# Patient Record
Sex: MALE | Race: BLACK OR AFRICAN AMERICAN | NOT HISPANIC OR LATINO | Employment: FULL TIME | URBAN - METROPOLITAN AREA
[De-identification: names, ages, dates, MRNs, and addresses within clinical notes are randomized per-mention and may not be internally consistent; named-entity substitution may affect disease eponyms.]

---

## 2017-11-20 ENCOUNTER — HOSPITAL ENCOUNTER (EMERGENCY)
Facility: HOSPITAL | Age: 27
Discharge: HOME | End: 2017-11-20
Attending: EMERGENCY MEDICINE
Payer: COMMERCIAL

## 2017-11-20 VITALS
DIASTOLIC BLOOD PRESSURE: 75 MMHG | HEIGHT: 75 IN | HEART RATE: 84 BPM | WEIGHT: 194.69 LBS | RESPIRATION RATE: 16 BRPM | TEMPERATURE: 100 F | SYSTOLIC BLOOD PRESSURE: 138 MMHG | BODY MASS INDEX: 24.21 KG/M2 | OXYGEN SATURATION: 97 %

## 2017-11-20 DIAGNOSIS — K52.9 ENTERITIS: Primary | ICD-10-CM

## 2017-11-20 LAB
ALBUMIN SERPL BCP-MCNC: 4 G/DL
ALP SERPL-CCNC: 88 U/L
ALT SERPL W/O P-5'-P-CCNC: 12 U/L
ANION GAP SERPL CALC-SCNC: 11 MMOL/L
AST SERPL-CCNC: 21 U/L
BASOPHILS # BLD AUTO: 0 K/UL
BASOPHILS NFR BLD: 0.2 %
BILIRUB SERPL-MCNC: 0.4 MG/DL
BILIRUB UR QL STRIP: NEGATIVE
BUN SERPL-MCNC: 10 MG/DL
CALCIUM SERPL-MCNC: 9.7 MG/DL
CHLORIDE SERPL-SCNC: 104 MMOL/L
CLARITY UR: CLEAR
CO2 SERPL-SCNC: 24 MMOL/L
COLOR UR: YELLOW
CREAT SERPL-MCNC: 1.2 MG/DL
DIFFERENTIAL METHOD: ABNORMAL
EOSINOPHIL # BLD AUTO: 0.1 K/UL
EOSINOPHIL NFR BLD: 2.2 %
ERYTHROCYTE [DISTWIDTH] IN BLOOD BY AUTOMATED COUNT: 13.6 %
EST. GFR  (AFRICAN AMERICAN): >60 ML/MIN/1.73 M^2
EST. GFR  (NON AFRICAN AMERICAN): >60 ML/MIN/1.73 M^2
GLUCOSE SERPL-MCNC: 83 MG/DL
GLUCOSE UR QL STRIP: NEGATIVE
HCT VFR BLD AUTO: 48.3 %
HGB BLD-MCNC: 16.2 G/DL
HGB UR QL STRIP: NEGATIVE
KETONES UR QL STRIP: NEGATIVE
LEUKOCYTE ESTERASE UR QL STRIP: NEGATIVE
LIPASE SERPL-CCNC: 16 U/L
LYMPHOCYTES # BLD AUTO: 1.3 K/UL
LYMPHOCYTES NFR BLD: 21.6 %
MCH RBC QN AUTO: 28.7 PG
MCHC RBC AUTO-ENTMCNC: 33.5 G/DL
MCV RBC AUTO: 86 FL
MONOCYTES # BLD AUTO: 1.2 K/UL
MONOCYTES NFR BLD: 19.9 %
NEUTROPHILS # BLD AUTO: 3.4 K/UL
NEUTROPHILS NFR BLD: 56.1 %
NITRITE UR QL STRIP: NEGATIVE
PH UR STRIP: 6 [PH] (ref 5–8)
PLATELET # BLD AUTO: 218 K/UL
PMV BLD AUTO: 7.6 FL
POTASSIUM SERPL-SCNC: 3.8 MMOL/L
PROT SERPL-MCNC: 8 G/DL
PROT UR QL STRIP: NEGATIVE
RBC # BLD AUTO: 5.64 M/UL
SODIUM SERPL-SCNC: 139 MMOL/L
SP GR UR STRIP: <=1.005 (ref 1–1.03)
URN SPEC COLLECT METH UR: ABNORMAL
UROBILINOGEN UR STRIP-ACNC: NEGATIVE EU/DL
WBC # BLD AUTO: 6.1 K/UL

## 2017-11-20 PROCEDURE — 99284 EMERGENCY DEPT VISIT MOD MDM: CPT | Mod: 25

## 2017-11-20 PROCEDURE — 25000003 PHARM REV CODE 250: Performed by: EMERGENCY MEDICINE

## 2017-11-20 PROCEDURE — 96360 HYDRATION IV INFUSION INIT: CPT

## 2017-11-20 PROCEDURE — 36415 COLL VENOUS BLD VENIPUNCTURE: CPT

## 2017-11-20 PROCEDURE — 80053 COMPREHEN METABOLIC PANEL: CPT

## 2017-11-20 PROCEDURE — 25500020 PHARM REV CODE 255

## 2017-11-20 PROCEDURE — 81003 URINALYSIS AUTO W/O SCOPE: CPT

## 2017-11-20 PROCEDURE — 85025 COMPLETE CBC W/AUTO DIFF WBC: CPT

## 2017-11-20 PROCEDURE — 83690 ASSAY OF LIPASE: CPT

## 2017-11-20 RX ORDER — DICYCLOMINE HYDROCHLORIDE 10 MG/ML
20 INJECTION INTRAMUSCULAR
Status: DISCONTINUED | OUTPATIENT
Start: 2017-11-20 | End: 2017-11-20 | Stop reason: HOSPADM

## 2017-11-20 RX ORDER — DICYCLOMINE HYDROCHLORIDE 20 MG/1
20 TABLET ORAL 3 TIMES DAILY PRN
Qty: 20 TABLET | Refills: 0 | Status: SHIPPED | OUTPATIENT
Start: 2017-11-20 | End: 2017-12-20

## 2017-11-20 RX ORDER — SODIUM CHLORIDE 9 MG/ML
1000 INJECTION, SOLUTION INTRAVENOUS
Status: COMPLETED | OUTPATIENT
Start: 2017-11-20 | End: 2017-11-20

## 2017-11-20 RX ADMIN — SODIUM CHLORIDE 1000 ML: 0.9 INJECTION, SOLUTION INTRAVENOUS at 08:11

## 2017-11-20 RX ADMIN — IOHEXOL 100 ML: 350 INJECTION, SOLUTION INTRAVENOUS at 08:11

## 2017-11-21 NOTE — ED PROVIDER NOTES
"Encounter Date: 11/20/2017    SCRIBE #1 NOTE: I, Valencia Stover, am scribing for, and in the presence of,  Dr. Stoll . I have scribed the entire note.       History     Chief Complaint   Patient presents with    Abdominal Pain     Started after eating a thanksgiving meal at work. Started with the pain and diarrhea. History of H.pylori. No fever, nausea or vomiting    Diarrhea     11/20/2017  7:50 PM     Chief Complaint:  Abdominal pain       The patient is a 27 y.o. male who is presenting with the gradual onset of epigastric abdominal pain that began x 4 days ago. The pt reports that the pain is constant, moderate, and feels like "someone is punching him". He notes that eating exacerbates is pain while nothing has helped to relieve it. He endorses numerous episodes of non bloody diarrhea. No recent emesis, fevers, constipation, or urinary sx. He reports that his sx began after eating a [place] dinner but denies known sick contacts. Hx of H. Pylori. No pertinent past surgical hx               The history is provided by the patient and medical records.     Review of patient's allergies indicates:  No Known Allergies  Past Medical History:   Diagnosis Date    Helicobacter pylori infection      Past Surgical History:   Procedure Laterality Date    FRACTURE SURGERY Right 2008    RIGHT HAND     Family History   Problem Relation Age of Onset    Cancer Mother      Social History   Substance Use Topics    Smoking status: Never Smoker    Smokeless tobacco: Never Used    Alcohol use Yes      Comment: rarely     Review of Systems   Constitutional: Negative for fever.   HENT: Negative for sore throat.    Respiratory: Negative for shortness of breath.    Cardiovascular: Negative for chest pain.   Gastrointestinal: Positive for abdominal pain and diarrhea. Negative for nausea.   Genitourinary: Negative for dysuria.   Musculoskeletal: Negative for back pain.   Skin: Negative for rash.   Neurological: Negative for weakness. "   Hematological: Does not bruise/bleed easily.       Physical Exam     Initial Vitals [11/20/17 1934]   BP Pulse Resp Temp SpO2   138/75 84 16 99.6 °F (37.6 °C) 97 %      MAP       96         Physical Exam    Nursing note and vitals reviewed.  Constitutional: He appears well-developed and well-nourished. He is not diaphoretic. No distress.   HENT:   Head: Normocephalic and atraumatic.   Mouth/Throat: Oropharynx is clear and moist.   Eyes: EOM are normal. Pupils are equal, round, and reactive to light.   Neck: Normal range of motion. Neck supple.   Cardiovascular: Normal rate, regular rhythm, normal heart sounds and intact distal pulses. Exam reveals no gallop and no friction rub.    No murmur heard.  Pulmonary/Chest: Breath sounds normal. No respiratory distress. He has no wheezes. He has no rhonchi. He has no rales.   Abdominal: Soft. He exhibits no distension. There is tenderness. There is no rebound and no guarding.   Epigastric and LLQ TTP.    Musculoskeletal: Normal range of motion.   Neurological: He is alert and oriented to person, place, and time.   Skin: Skin is warm.   Psychiatric: He has a normal mood and affect. His behavior is normal. Judgment and thought content normal.         ED Course   Procedures  Labs Reviewed   COMPREHENSIVE METABOLIC PANEL   CBC W/ AUTO DIFFERENTIAL   LIPASE   URINALYSIS             Medical Decision Making:   Initial Assessment:   27-year-old male presented with a chief complaint of abdominal pain and diarrhea.  Differential Diagnosis:   Ddx includes but is not limited to diverticulitis, enteritis, colitis, and pancreatitis.   Clinical Tests:   Lab Tests: Ordered and Reviewed  Radiological Study: Ordered and Reviewed  ED Management:  The patient was emergently evaluated in the emergency Department, his evaluation was significant for a young male with abdominal tenderness.  The patient's labs showed no acute processes.  The patient refused anything for pain relief in the  emergency department.  The patient's CT scan was significant for a mild enteritis.  He is stable for discharge to home.  He will be discharged home with by mouth Bentyl and he is to follow-up with his PCP for further care.            Scribe Attestation:   Scribe #1: I performed the above scribed service and the documentation accurately describes the services I performed. I attest to the accuracy of the note.    I, Dr. Emiliano Stoll, personally performed the services described in this documentation. All medical record entries made by the scribe were at my direction and in my presence.  I have reviewed the chart and agree that the record reflects my personal performance and is accurate and complete. Emiliano Stoll MD.  7:58 PM 11/20/2017          ED Course      Clinical Impression:   There were no encounter diagnoses.                           Emiliano Stoll MD  11/20/17 0207

## 2019-11-23 ENCOUNTER — HOSPITAL ENCOUNTER (EMERGENCY)
Facility: HOSPITAL | Age: 29
Discharge: HOME | End: 2019-11-24
Attending: EMERGENCY MEDICINE
Payer: COMMERCIAL

## 2019-11-23 DIAGNOSIS — R10.13 EPIGASTRIC PAIN: Primary | ICD-10-CM

## 2019-11-23 PROCEDURE — 99283 EMERGENCY DEPT VISIT LOW MDM: CPT

## 2019-11-23 RX ADMIN — LIDOCAINE HYDROCHLORIDE: 20 SOLUTION ORAL; TOPICAL at 11:11

## 2019-11-24 VITALS
TEMPERATURE: 99 F | SYSTOLIC BLOOD PRESSURE: 128 MMHG | DIASTOLIC BLOOD PRESSURE: 78 MMHG | HEIGHT: 74 IN | BODY MASS INDEX: 26.31 KG/M2 | OXYGEN SATURATION: 99 % | HEART RATE: 71 BPM | WEIGHT: 205 LBS | RESPIRATION RATE: 16 BRPM

## 2019-11-24 LAB
ALBUMIN SERPL BCP-MCNC: 4 G/DL (ref 3.5–5.2)
ALP SERPL-CCNC: 71 U/L (ref 55–135)
ALT SERPL W/O P-5'-P-CCNC: 10 U/L (ref 10–44)
ANION GAP SERPL CALC-SCNC: 10 MMOL/L (ref 8–16)
AST SERPL-CCNC: 15 U/L (ref 10–40)
BASOPHILS # BLD AUTO: 0.01 K/UL (ref 0–0.2)
BASOPHILS NFR BLD: 0.1 % (ref 0–1.9)
BILIRUB SERPL-MCNC: 0.4 MG/DL (ref 0.1–1)
BUN SERPL-MCNC: 9 MG/DL (ref 6–20)
CALCIUM SERPL-MCNC: 9.4 MG/DL (ref 8.7–10.5)
CHLORIDE SERPL-SCNC: 100 MMOL/L (ref 95–110)
CO2 SERPL-SCNC: 30 MMOL/L (ref 23–29)
CREAT SERPL-MCNC: 1.1 MG/DL (ref 0.5–1.4)
DIFFERENTIAL METHOD: ABNORMAL
EOSINOPHIL # BLD AUTO: 0.3 K/UL (ref 0–0.5)
EOSINOPHIL NFR BLD: 4.1 % (ref 0–8)
ERYTHROCYTE [DISTWIDTH] IN BLOOD BY AUTOMATED COUNT: 12.9 % (ref 11.5–14.5)
EST. GFR  (AFRICAN AMERICAN): >60 ML/MIN/1.73 M^2
EST. GFR  (NON AFRICAN AMERICAN): >60 ML/MIN/1.73 M^2
GLUCOSE SERPL-MCNC: 98 MG/DL (ref 70–110)
HCT VFR BLD AUTO: 44.5 % (ref 40–54)
HGB BLD-MCNC: 15 G/DL (ref 14–18)
IMM GRANULOCYTES # BLD AUTO: 0.03 K/UL (ref 0–0.04)
LIPASE SERPL-CCNC: 14 U/L (ref 4–60)
LYMPHOCYTES # BLD AUTO: 1.4 K/UL (ref 1–4.8)
LYMPHOCYTES NFR BLD: 17.9 % (ref 18–48)
MCH RBC QN AUTO: 29.5 PG (ref 27–31)
MCHC RBC AUTO-ENTMCNC: 33.7 G/DL (ref 32–36)
MCV RBC AUTO: 88 FL (ref 82–98)
MONOCYTES # BLD AUTO: 1.2 K/UL (ref 0.3–1)
MONOCYTES NFR BLD: 16.2 % (ref 4–15)
NEUTROPHILS # BLD AUTO: 4.7 K/UL (ref 1.8–7.7)
NEUTROPHILS NFR BLD: 61.3 % (ref 38–73)
NRBC BLD-RTO: 0 /100 WBC
PLATELET # BLD AUTO: 233 K/UL (ref 150–350)
PMV BLD AUTO: 9.4 FL (ref 9.2–12.9)
POTASSIUM SERPL-SCNC: 3.6 MMOL/L (ref 3.5–5.1)
PROT SERPL-MCNC: 7.4 G/DL (ref 6–8.4)
RBC # BLD AUTO: 5.08 M/UL (ref 4.6–6.2)
SODIUM SERPL-SCNC: 140 MMOL/L (ref 136–145)
WBC # BLD AUTO: 7.65 K/UL (ref 3.9–12.7)

## 2019-11-24 PROCEDURE — 83690 ASSAY OF LIPASE: CPT

## 2019-11-24 PROCEDURE — 36415 COLL VENOUS BLD VENIPUNCTURE: CPT

## 2019-11-24 PROCEDURE — 25000003 PHARM REV CODE 250: Performed by: EMERGENCY MEDICINE

## 2019-11-24 PROCEDURE — 80053 COMPREHEN METABOLIC PANEL: CPT

## 2019-11-24 PROCEDURE — 85025 COMPLETE CBC W/AUTO DIFF WBC: CPT

## 2019-11-24 RX ORDER — LANSOPRAZOLE, AMOXICILLIN, CLARITHROMYCIN 30-500-500
KIT ORAL
Qty: 1 KIT | Refills: 0 | Status: SHIPPED | OUTPATIENT
Start: 2019-11-24 | End: 2019-12-08

## 2019-11-24 RX ORDER — LANSOPRAZOLE, AMOXICILLIN, CLARITHROMYCIN 30-500-500
KIT ORAL
Qty: 1 KIT | Refills: 0 | Status: SHIPPED | OUTPATIENT
Start: 2019-11-24 | End: 2019-11-24 | Stop reason: SDUPTHER

## 2019-11-24 NOTE — ED NOTES
Assumed care:  Bernardino Bolanos is awake, alert and oriented x 3, skin warm and dry, in NAD.  CO abd pain that started 3 days ago after eating a popeyes chicken sandwich.  States he had diarrhea then but now that's gone but still having pain.  Denies NV&D now.    Patient identifiers for Bernardino Bolanos checked and correct.  LOC:  Bernardino Bolanos is awake, alert, and aware of environment with an appropriate affect. He is oriented x 3 and speaking appropriately.  APPEARANCE:  He is resting comfortably and in no acute distress. He is clean and well groomed, patient's clothing is properly fastened.  SKIN:  The skin is warm and dry. He has normal skin turgor and moist mucus membranes. Skin is intact; no bruising or breakdown noted.  MUSCULOSKELETAL:  He is moving all extremities well, no obvious deformities noted. Pulses intact.   RESPIRATORY:  Airway is open and patent. Respirations are spontaneous and non-labored with normal effort and rate.  CARDIAC:  He has a normal rate and rhythm. No peripheral edema noted. Capillary refill < 3 seconds.  ABDOMEN:  No distention noted.  Soft and non-tender upon palpation.  abd pain  NEUROLOGICAL:  PERRL. Facial expression is symmetrical. Hand grasps are equal bilaterally. Normal sensation in all extremities when touched with finger.  Allergies reported:  Review of patient's allergies indicates:  No Known Allergies  OTHER NOTES:

## 2019-11-24 NOTE — ED PROVIDER NOTES
Encounter Date: 11/23/2019    SCRIBE #1 NOTE: I, Krissy Charles, am scribing for, and in the presence of, Christ Bustillos MD.       History     Chief Complaint   Patient presents with    Abdominal Pain     x 3 days       Time seen by provider: 11:44 PM on 11/23/2019    Bernardino Bolanos is a 29 y.o. male with PMHx of H. Pylori who presents to the ED with abdominal pain since 3 days ago. He reports that his symptoms are similar to the symptoms he had with the H. Pylori infection several years ago.  He is complaining of epigastric pain without radiation.  No aggravating or alleviating factors.  Decreased appetite.  He denies worsening pain after eating. He denies pain that radiates to the back, N/V/D, blood in stool, or any other symptoms at this time. He denies Hx of smoking, drinking, or drug use. He states that tylenol provides some relief of abdominal pain. He went on a cruise to [place] 11/9/19-11/13/19 with no apparent illness afterwards. No abdominal PSHx.    The history is provided by the patient.     Review of patient's allergies indicates:  No Known Allergies  Past Medical History:   Diagnosis Date    Helicobacter pylori infection      Past Surgical History:   Procedure Laterality Date    FRACTURE SURGERY Right 2008    RIGHT HAND     Family History   Problem Relation Age of Onset    Cancer Mother      Social History     Tobacco Use    Smoking status: Never Smoker    Smokeless tobacco: Never Used   Substance Use Topics    Alcohol use: Yes     Comment: rarely    Drug use: No     Review of Systems   Constitutional: Positive for appetite change (Decreased). Negative for activity change and fatigue.   HENT: Negative for rhinorrhea.    Respiratory: Negative for cough.    Cardiovascular: Negative for leg swelling.   Gastrointestinal: Positive for abdominal pain. Negative for blood in stool, diarrhea, nausea and vomiting.   Endocrine: Negative for polyuria.   Genitourinary: Negative for dysuria.    Musculoskeletal: Negative for back pain.   Skin: Negative for rash.   Hematological: Negative for adenopathy.       Physical Exam     Initial Vitals [11/23/19 2337]   BP Pulse Resp Temp SpO2   133/86 74 16 98.5 °F (36.9 °C) 99 %      MAP       --         Physical Exam    Nursing note and vitals reviewed.  Constitutional: He appears well-developed and well-nourished. He is not diaphoretic.  Non-toxic appearance. He does not have a sickly appearance. He does not appear ill. No distress.   HENT:   Head: Normocephalic and atraumatic.   Mouth/Throat: Oropharynx is clear and moist.   Eyes: EOM are normal.   Neck: Normal range of motion. Neck supple. Normal range of motion present. No neck rigidity.   Cardiovascular: Normal rate, regular rhythm and normal heart sounds. Exam reveals no gallop and no friction rub.    No murmur heard.  Pulmonary/Chest: Breath sounds normal. No respiratory distress. He has no wheezes. He has no rhonchi. He has no rales.   Abdominal: He exhibits no distension and no mass. There is tenderness in the epigastric area. There is no rigidity, no rebound, no guarding and negative Lee's sign.   Musculoskeletal: Normal range of motion.   Neurological: He is alert and oriented to person, place, and time.   Skin: Skin is warm and dry. No rash noted.   Psychiatric: He has a normal mood and affect. His behavior is normal. Judgment and thought content normal.         ED Course   Procedures  Labs Reviewed - No data to display       Imaging Results    None          Medical Decision Making:   History:   Old Medical Records: I decided to obtain old medical records.  Clinical Tests:   Lab Tests: Ordered and Reviewed            Scribe Attestation:   Scribe #1: I performed the above scribed service and the documentation accurately describes the services I performed. I attest to the accuracy of the note.    I, Dr. Bustillos, personally performed the services described in this documentation. All medical record  entries made by the scribe were at my direction and in my presence.  I have reviewed the chart and agree that the record reflects my personal performance and is accurate and complete.1:37 AM 11/24/2019            ED Course as of Nov 24 0134   Sat Nov 23, 2019 2339 BP: 133/86 [EF]   2339 Temp: 98.5 °F (36.9 °C) [EF]   2339 Temp src: Oral [EF]   2339 Pulse: 74 [EF]   2339 Resp: 16 [EF]   2339 SpO2: 99 % [EF]   Sun Nov 24, 2019   0045 Labs are unremarkable, patient feels better after GI cocktail    [EF]   0105 29-year-old with a history of H pylori gastritis presents with epigastric pain for 3 days consistent with prior episode several years ago of H pylori.  Labs are unremarkable. Some epigastric tenderness on exam but no right upper quadrant tenderness to suggest cholecystitis or cholelithiasis.  No vomiting in the emergency room no reported hematemesis melena.  Well-appearing in the ED no imaging is warranted in my opinion no indication for an ultrasound or a CT.  Pain improved with GI cocktail.  I did discuss options including over-the-counter meds for gastritis but the patient would like to start treatment for possible H pylori.  No confirmatory test in the emergency room but he reports identical symptoms to a previous episode when he was treated successfully.  Patient will be prescribed treatment for H pylori and referred to GI.    [EF]      ED Course User Index  [EF] Christ Bustillos MD                Clinical Impression:       ICD-10-CM ICD-9-CM   1. Epigastric pain R10.13 789.06                             Christ Bustillos MD  11/24/19 0137

## 2019-12-04 ENCOUNTER — OFFICE VISIT (OUTPATIENT)
Dept: FAMILY MEDICINE | Facility: CLINIC | Age: 29
End: 2019-12-04
Payer: COMMERCIAL

## 2019-12-04 VITALS
HEIGHT: 74 IN | OXYGEN SATURATION: 96 % | HEART RATE: 70 BPM | BODY MASS INDEX: 26 KG/M2 | TEMPERATURE: 99 F | DIASTOLIC BLOOD PRESSURE: 64 MMHG | WEIGHT: 202.63 LBS | SYSTOLIC BLOOD PRESSURE: 114 MMHG

## 2019-12-04 DIAGNOSIS — R19.7 DIARRHEA, UNSPECIFIED TYPE: ICD-10-CM

## 2019-12-04 DIAGNOSIS — E66.3 OVERWEIGHT (BMI 25.0-29.9): ICD-10-CM

## 2019-12-04 DIAGNOSIS — R10.13 EPIGASTRIC PAIN: Primary | ICD-10-CM

## 2019-12-04 PROCEDURE — 99999 PR PBB SHADOW E&M-EST. PATIENT-LVL III: ICD-10-PCS | Mod: PBBFAC,,, | Performed by: NURSE PRACTITIONER

## 2019-12-04 PROCEDURE — 3008F BODY MASS INDEX DOCD: CPT | Mod: CPTII,S$GLB,, | Performed by: NURSE PRACTITIONER

## 2019-12-04 PROCEDURE — 99203 OFFICE O/P NEW LOW 30 MIN: CPT | Mod: S$GLB,,, | Performed by: NURSE PRACTITIONER

## 2019-12-04 PROCEDURE — 3008F PR BODY MASS INDEX (BMI) DOCUMENTED: ICD-10-PCS | Mod: CPTII,S$GLB,, | Performed by: NURSE PRACTITIONER

## 2019-12-04 PROCEDURE — 99999 PR PBB SHADOW E&M-EST. PATIENT-LVL III: CPT | Mod: PBBFAC,,, | Performed by: NURSE PRACTITIONER

## 2019-12-04 PROCEDURE — 99203 PR OFFICE/OUTPT VISIT, NEW, LEVL III, 30-44 MIN: ICD-10-PCS | Mod: S$GLB,,, | Performed by: NURSE PRACTITIONER

## 2019-12-05 NOTE — PROGRESS NOTES
Subjective:       Patient ID: Bernardino Bolanos is a 29 y.o. male.    Chief Complaint: [facility] ER  epigastric pain    29-year-old with a history of H pylori gastritis presents today for ER follow up of Epigastric pain. Patient was seen in ER for epigastric pain for 3 days consistent with prior episode several years ago of H pylori. Pain improved with GI cocktail.  Patient reports him and his daughter was eating popeyes chicken sandwich and both started with diarrhea the next day. He reports the abdominal pain is not the same when he was diagnosis with H.pylori.    Abdominal Pain   This is a new problem. The current episode started in the past 7 days. The problem occurs intermittently. The problem has been gradually improving. The pain is located in the epigastric region. The pain is at a severity of 3/10. The pain is mild. The quality of the pain is sharp. The abdominal pain does not radiate. Pertinent negatives include no belching, constipation, diarrhea, flatus, headaches, hematochezia, hematuria, nausea or vomiting. The pain is aggravated by palpation. The pain is relieved by nothing. He has tried acetaminophen for the symptoms. The treatment provided mild relief. Prior diagnostic workup includes upper endoscopy.       Past Medical History:   Diagnosis Date    Helicobacter pylori infection        Review of patient's allergies indicates:  No Known Allergies    No current outpatient medications on file.    Review of Systems   Constitutional: Negative for unexpected weight change.   HENT: Negative for trouble swallowing.    Eyes: Negative for visual disturbance.   Respiratory: Negative for shortness of breath.    Cardiovascular: Negative for chest pain, palpitations and leg swelling.   Gastrointestinal: Positive for abdominal pain. Negative for blood in stool, constipation, diarrhea, flatus, hematochezia, nausea and vomiting.   Genitourinary: Negative for hematuria.   Skin: Negative for rash.   Allergic/Immunologic:  "Negative for immunocompromised state.   Neurological: Negative for headaches.   Hematological: Does not bruise/bleed easily.   Psychiatric/Behavioral: Negative for agitation. The patient is not nervous/anxious.        Objective:      /64   Pulse 70   Temp 98.9 °F (37.2 °C)   Ht 6' 2" (1.88 m)   Wt 91.9 kg (202 lb 9.6 oz)   SpO2 96%   BMI 26.01 kg/m²   Physical Exam   Constitutional: He is oriented to person, place, and time. He appears well-developed and well-nourished.   Eyes: Pupils are equal, round, and reactive to light. Conjunctivae and EOM are normal.   Neck: Normal range of motion. Neck supple.   Cardiovascular: Normal rate, regular rhythm, normal heart sounds and intact distal pulses.   Pulmonary/Chest: Effort normal and breath sounds normal.   Abdominal: Soft. Bowel sounds are normal. There is tenderness in the epigastric area.   Musculoskeletal: Normal range of motion.   Neurological: He is alert and oriented to person, place, and time.   Skin: Skin is warm and dry.   Psychiatric: He has a normal mood and affect. His behavior is normal. Judgment and thought content normal.       Assessment:       1. Epigastric pain    2. Diarrhea, unspecified type    3. Overweight (BMI 25.0-29.9)        Plan:       Epigastric pain  Improved. Patient thinks it was related to foods  Diarrhea, unspecified type  Improved, take OTC Inodium  Overweight (BMI 25.0-29.9)  Counseled patient on his ideal body weight, health consequences of being obese and current recommendations including weekly exercise and a heart healthy diet.  Current BMI is:Estimated body mass index is 26.01 kg/m² as calculated from the following:    Height as of this encounter: 6' 2" (1.88 m).    Weight as of this encounter: 91.9 kg (202 lb 9.6 oz)..  Patient is aware that ideal BMI < 25 or Weight in (lb) to have BMI = 25: 194.3.          .  "

## 2020-07-23 ENCOUNTER — OFFICE VISIT (OUTPATIENT)
Dept: FAMILY MEDICINE | Facility: CLINIC | Age: 30
End: 2020-07-23
Payer: COMMERCIAL

## 2020-07-23 DIAGNOSIS — R11.0 NAUSEA: ICD-10-CM

## 2020-07-23 DIAGNOSIS — R19.7 DIARRHEA, UNSPECIFIED TYPE: Primary | ICD-10-CM

## 2020-07-23 DIAGNOSIS — E66.3 OVERWEIGHT (BMI 25.0-29.9): ICD-10-CM

## 2020-07-23 PROCEDURE — 99213 PR OFFICE/OUTPT VISIT, EST, LEVL III, 20-29 MIN: ICD-10-PCS | Mod: 95,,, | Performed by: NURSE PRACTITIONER

## 2020-07-23 PROCEDURE — 99213 OFFICE O/P EST LOW 20 MIN: CPT | Mod: 95,,, | Performed by: NURSE PRACTITIONER

## 2020-07-23 NOTE — PROGRESS NOTES
Subjective:       Patient ID: Bernardino Bolanos is a 30 y.o. male.    Chief Complaint: No chief complaint on file.    The patient location is: [place], [place].  The chief complaint leading to consultation is: diarrhea and nausea    Visit type: audiovisual    Face to Face time with patient: 20 minutes of total time spent on the encounter, which includes face to face time and non-face to face time preparing to see the patient (eg, review of tests), Obtaining and/or reviewing separately obtained history, Documenting clinical information in the electronic or other health record, Independently interpreting results (not separately reported) and communicating results to the patient/family/caregiver, or Care coordination (not separately reported).         Each patient to whom he or she provides medical services by telemedicine is:  (1) informed of the relationship between the physician and patient and the respective role of any other health care provider with respect to management of the patient; and (2) notified that he or she may decline to receive medical services by telemedicine and may withdraw from such care at any time.    Notes: Patient presents today via Telemedicine with complains of diarrhea and nausea for two days. Had pasta on yesterday, diarrhea an nausea strated 1-2 hours later. He is taking pepto bismuthal and diarrhea has improved.    Diarrhea   This is a new problem. The current episode started yesterday. The problem occurs 2 to 4 times per day. The problem has been gradually improving. The stool consistency is described as watery. The patient states that diarrhea does not awaken him from sleep. Pertinent negatives include no abdominal pain, bloating, coughing, fever, headaches, increased  flatus or vomiting. Nothing aggravates the symptoms. Risk factors include suspect food intake. He has tried bismuth subsalicylate for the symptoms.       Past Medical History:   Diagnosis Date    Helicobacter pylori  "infection        Review of patient's allergies indicates:  No Known Allergies    No current outpatient medications on file.    Review of Systems   Constitutional: Negative for fever and unexpected weight change.   HENT: Negative for trouble swallowing.    Eyes: Negative for visual disturbance.   Respiratory: Negative for cough and shortness of breath.    Cardiovascular: Negative for chest pain, palpitations and leg swelling.   Gastrointestinal: Positive for diarrhea and nausea. Negative for abdominal pain, bloating, blood in stool, flatus and vomiting.   Genitourinary: Negative for hematuria.   Skin: Negative for rash.   Allergic/Immunologic: Negative for immunocompromised state.   Neurological: Negative for headaches.   Hematological: Does not bruise/bleed easily.   Psychiatric/Behavioral: Negative for agitation. The patient is not nervous/anxious.        Objective:      There were no vitals taken for this visit.  Physical Exam  Pulmonary:      Effort: Pulmonary effort is normal.   Neurological:      Mental Status: He is alert and oriented to person, place, and time.   Psychiatric:         Mood and Affect: Mood normal.         Behavior: Behavior normal.         Thought Content: Thought content normal.         Judgment: Judgment normal.         Assessment:       1. Diarrhea, unspecified type    2. Nausea    3. Overweight (BMI 25.0-29.9)        Plan:       Diarrhea, unspecified type  Patient reports improvement with medication, no diarrhea today  Work excuse given  Nausea  Patient reports improvement with medication, no nausea today  Overweight (BMI 25.0-29.9)  Counseled patient on his ideal body weight, health consequences of being obese and current recommendations including weekly exercise and a heart healthy diet.  Current BMI is:Estimated body mass index is 26.01 kg/m² as calculated from the following:    Height as of 12/4/19: 6' 2" (1.88 m).    Weight as of 12/4/19: 91.9 kg (202 lb 9.6 oz)..  Patient is aware " that ideal BMI < 25 or  .            Time spent with patient: 20 minutes    Patient with be reevaluated in 1 year or sooner prn    Greater than 50% of this visit was spent counseling as described in above documentation:Yes

## 2020-07-23 NOTE — LETTER
July 23, 2020      [facility]  [address]  [place] [address]  Phone: [phone]  Fax: [phone]       Patient: Bernardino Bolanos   YOB: 1990  Date of Visit: 07/23/2020    To Whom It May Concern:    Mattie Bolanos  was at [facility] on 07/23/2020. Please excuse Mr. Bustillos on 07/22/20 and 07/23/20. He may return to work/school on 07/24/20 with no restrictions. If you have any questions or concerns, or if I can be of further assistance, please do not hesitate to contact me.    Sincerely,    Beverley Pro NP

## 2020-09-02 ENCOUNTER — OFFICE VISIT (OUTPATIENT)
Dept: FAMILY MEDICINE | Facility: CLINIC | Age: 30
End: 2020-09-02
Payer: COMMERCIAL

## 2020-09-02 VITALS
HEART RATE: 68 BPM | SYSTOLIC BLOOD PRESSURE: 124 MMHG | TEMPERATURE: 98 F | WEIGHT: 199.06 LBS | BODY MASS INDEX: 25.56 KG/M2 | DIASTOLIC BLOOD PRESSURE: 60 MMHG

## 2020-09-02 DIAGNOSIS — H93.8X1 SENSATION OF FULLNESS IN RIGHT EAR: ICD-10-CM

## 2020-09-02 DIAGNOSIS — H91.91: Primary | ICD-10-CM

## 2020-09-02 DIAGNOSIS — E66.3 OVERWEIGHT (BMI 25.0-29.9): ICD-10-CM

## 2020-09-02 PROCEDURE — 99213 OFFICE O/P EST LOW 20 MIN: CPT | Mod: S$GLB,,, | Performed by: NURSE PRACTITIONER

## 2020-09-02 PROCEDURE — 3008F PR BODY MASS INDEX (BMI) DOCUMENTED: ICD-10-PCS | Mod: CPTII,S$GLB,, | Performed by: NURSE PRACTITIONER

## 2020-09-02 PROCEDURE — 99999 PR PBB SHADOW E&M-EST. PATIENT-LVL IV: ICD-10-PCS | Mod: PBBFAC,,, | Performed by: NURSE PRACTITIONER

## 2020-09-02 PROCEDURE — 99999 PR PBB SHADOW E&M-EST. PATIENT-LVL IV: CPT | Mod: PBBFAC,,, | Performed by: NURSE PRACTITIONER

## 2020-09-02 PROCEDURE — 99213 PR OFFICE/OUTPT VISIT, EST, LEVL III, 20-29 MIN: ICD-10-PCS | Mod: S$GLB,,, | Performed by: NURSE PRACTITIONER

## 2020-09-02 PROCEDURE — 3008F BODY MASS INDEX DOCD: CPT | Mod: CPTII,S$GLB,, | Performed by: NURSE PRACTITIONER

## 2020-09-02 NOTE — PROGRESS NOTES
Subjective:       Patient ID: Bernardino Bolanos is a 30 y.o. male.    Chief Complaint: Otalgia    Otalgia   There is pain in the right ear. This is a recurrent problem. The current episode started in the past 7 days. The problem occurs every few hours. The problem has been gradually worsening. There has been no fever. The pain is at a severity of 0/10. The patient is experiencing no pain. Associated symptoms include hearing loss. Pertinent negatives include no coughing, diarrhea, ear discharge, headaches, neck pain, rhinorrhea or vomiting. He has tried ear drops for the symptoms.       Past Medical History:   Diagnosis Date    Helicobacter pylori infection        Review of patient's allergies indicates:  No Known Allergies    No current outpatient medications on file.    Review of Systems   Constitutional: Negative for activity change and unexpected weight change.   HENT: Positive for ear pain and hearing loss. Negative for ear discharge, rhinorrhea and trouble swallowing.    Eyes: Negative for discharge and visual disturbance.   Respiratory: Negative for cough, chest tightness and wheezing.    Cardiovascular: Negative for chest pain and palpitations.   Gastrointestinal: Negative for blood in stool, constipation, diarrhea and vomiting.   Endocrine: Negative for polydipsia and polyuria.   Genitourinary: Negative for difficulty urinating, hematuria and urgency.   Musculoskeletal: Negative for arthralgias, joint swelling and neck pain.   Neurological: Negative for weakness and headaches.   Psychiatric/Behavioral: Negative for confusion and dysphoric mood.       Objective:      /60 (BP Location: Left arm, Patient Position: Sitting, BP Method: Large (Manual))   Pulse 68   Temp 98.1 °F (36.7 °C)   Wt 90.3 kg (199 lb 1.2 oz)   BMI 25.56 kg/m²   Physical Exam  Constitutional:       Appearance: He is well-developed.   HENT:      Right Ear: Decreased hearing noted. No drainage, swelling or tenderness. No middle ear  "effusion. There is no impacted cerumen. No foreign body. Tympanic membrane is not injected, retracted or bulging.   Eyes:      Conjunctiva/sclera: Conjunctivae normal.      Pupils: Pupils are equal, round, and reactive to light.   Neck:      Musculoskeletal: Normal range of motion and neck supple.   Cardiovascular:      Rate and Rhythm: Normal rate and regular rhythm.      Heart sounds: Normal heart sounds.   Pulmonary:      Effort: Pulmonary effort is normal.      Breath sounds: Normal breath sounds.   Abdominal:      General: Bowel sounds are normal.      Palpations: Abdomen is soft.   Musculoskeletal: Normal range of motion.   Skin:     General: Skin is warm and dry.   Neurological:      Mental Status: He is alert and oriented to person, place, and time.   Psychiatric:         Behavior: Behavior normal.         Thought Content: Thought content normal.         Judgment: Judgment normal.         Assessment:       1. Decrease in the ability to hear, right    2. Sensation of fullness in right ear    3. Overweight (BMI 25.0-29.9)        Plan:       Decrease in the ability to hear, right  -     Ambulatory referral/consult to ENT; Future; Expected date: 09/09/2020    Sensation of fullness in right ear  -     Ambulatory referral/consult to ENT; Future; Expected date: 09/09/2020    Overweight (BMI 25.0-29.9)  Counseled patient on his ideal body weight, health consequences of being obese and current recommendations including weekly exercise and a heart healthy diet.  Current BMI is:Estimated body mass index is 25.56 kg/m² as calculated from the following:    Height as of 12/4/19: 6' 2" (1.88 m).    Weight as of this encounter: 90.3 kg (199 lb 1.2 oz)..  Patient is aware that ideal BMI < 25 or  .            Time spent with patient: 20 minutes    Patient with be reevaluated in 1 year or sooner prn    Greater than 50% of this visit was spent counseling as described in above documentation:Yes  "

## 2020-09-02 NOTE — PATIENT INSTRUCTIONS
Understanding Hearing Loss    As you age, some hearing loss is normal. But long-term exposure to loud noise can speed up the loss. You lose more than the ability to hear how loud a sound is. You also lose the ability to hear certain types of sounds. For example, you might not be able to hear some of the high-pitched sounds of a child's voice.  Normal loss  Each of us is born with about 40,000 hair cells. They thin out naturally as we age. With the loss of hair cells comes hearing loss. This is called presbycusis. Most people don't notice normal hearing loss until their middle years. Others might not notice it until late in their lives. It's most often a slow and painless process.  Accelerated loss  Exposure to loud noise may cause brief hearing loss and ringing in your ears called tinnitus. If your exposure was short, you may recover. But long-term exposure day after day can affect your hearing for life.  Noise hurts more than your hearing  Did you know that loud noises can affect your whole body? Loud noises can:  · Raise blood pressure  · Disrupt sleep patterns  · Cause muscle strain  · Harm digestion   Date Last Reviewed: 3/31/2015  © 7699-3891 [employer]. [address] [place] [address], [place] [address]. All rights reserved. This information is not intended as a substitute for professional medical care. Always follow your healthcare professional's instructions.

## 2020-09-08 ENCOUNTER — OFFICE VISIT (OUTPATIENT)
Dept: OTOLARYNGOLOGY | Facility: CLINIC | Age: 30
End: 2020-09-08
Payer: COMMERCIAL

## 2020-09-08 VITALS
DIASTOLIC BLOOD PRESSURE: 71 MMHG | BODY MASS INDEX: 25.41 KG/M2 | WEIGHT: 198 LBS | HEIGHT: 74 IN | HEART RATE: 72 BPM | OXYGEN SATURATION: 95 % | SYSTOLIC BLOOD PRESSURE: 133 MMHG

## 2020-09-08 DIAGNOSIS — H91.91: ICD-10-CM

## 2020-09-08 DIAGNOSIS — H93.8X1 SENSATION OF FULLNESS IN RIGHT EAR: ICD-10-CM

## 2020-09-08 PROCEDURE — 99203 OFFICE O/P NEW LOW 30 MIN: CPT | Mod: S$GLB,,, | Performed by: OTOLARYNGOLOGY

## 2020-09-08 PROCEDURE — 99203 PR OFFICE/OUTPT VISIT, NEW, LEVL III, 30-44 MIN: ICD-10-PCS | Mod: S$GLB,,, | Performed by: OTOLARYNGOLOGY

## 2020-09-08 PROCEDURE — 3008F BODY MASS INDEX DOCD: CPT | Mod: CPTII,S$GLB,, | Performed by: OTOLARYNGOLOGY

## 2020-09-08 PROCEDURE — 3008F PR BODY MASS INDEX (BMI) DOCUMENTED: ICD-10-PCS | Mod: CPTII,S$GLB,, | Performed by: OTOLARYNGOLOGY

## 2020-09-08 NOTE — PROGRESS NOTES
Subjective:       Patient ID: Bernardino Bolanos is a 30 y.o. male.    Chief Complaint: Right ear fullness    HPI   Bernardino Bolanos is a 29 yo male presenting with right sided ear fullness and muffled hearing. He reports that this has been present for several years. He feels like he cannot pop his right ear. Unsure if his hearing is decreased. No history of allergies. No PE tubes or frequent infections. No dizziness. Has had cerumen removed from his ears in childhood, nothing recent.    Review of Systems   Constitutional: Negative for activity change, appetite change, fatigue, fever and unexpected weight change.   HENT: Positive for hearing loss. Negative for nasal congestion, dental problem, ear discharge, ear pain, facial swelling, nosebleeds, postnasal drip, rhinorrhea, sinus pressure/congestion, sneezing, sore throat, trouble swallowing and voice change.    Eyes: Negative for photophobia, pain and itching.   Respiratory: Negative for apnea, cough, shortness of breath, wheezing and stridor.    Cardiovascular: Negative for chest pain and palpitations.   Gastrointestinal: Negative for abdominal pain, diarrhea, nausea and vomiting.   Endocrine: Negative for cold intolerance and heat intolerance.   Genitourinary: Negative for bladder incontinence and dysuria.   Musculoskeletal: Negative for arthralgias, myalgias, neck pain and neck stiffness.   Integumentary:  Negative for pallor, rash and mole/lesion.   Allergic/Immunologic: Negative for food allergies.   Neurological: Negative for dizziness, vertigo, seizures, syncope and headaches.   Psychiatric/Behavioral: Negative for behavioral problems and confusion.         Objective:      Physical Exam  Constitutional:       General: He is awake.      Appearance: Normal appearance. He is well-developed.   HENT:      Head: Normocephalic and atraumatic.      Right Ear: Tympanic membrane, ear canal and external ear normal.      Left Ear: Tympanic membrane, ear canal and external  ear normal.      Ears:      Rodrigez exam findings: does not lateralize.     Right Rinne: AC > BC.     Left Rinne: AC > BC.     Nose: Nose normal. No septal deviation.      Mouth/Throat:      Pharynx: Uvula midline.   Eyes:      General: Lids are normal. Vision grossly intact.      Conjunctiva/sclera: Conjunctivae normal.   Neck:      Musculoskeletal: Full passive range of motion without pain, normal range of motion and neck supple.      Thyroid: No thyroid mass or thyromegaly.   Pulmonary:      Effort: Pulmonary effort is normal. No tachypnea or respiratory distress.   Skin:     General: Skin is warm and dry.   Neurological:      Mental Status: He is alert and oriented to person, place, and time.   Psychiatric:         Mood and Affect: Mood and affect normal.         Assessment:       1. Decrease in the ability to hear, right    2. Sensation of fullness in right ear        Plan:       31 yo male presenting with right-sided ear fullness. No abnormalities seen on physical exam    -audiogram with tymps  -will call to discuss results

## 2020-09-08 NOTE — LETTER
September 8, 2020      Beverley Pro, LAVERNE  [address]  [place] [address]           [place] [facility]  [address]  [place] [address]  Phone: [phone]  Fax: [phone]          Patient: Bernardino Bolanos   MR Number: [id number]   YOB: 1990   Date of Visit: 9/8/2020       Dear Beverley Pro:    Thank you for referring Bernardino Bolanos to me for evaluation. Attached you will find relevant portions of my assessment and plan of care.    If you have questions, please do not hesitate to call me. I look forward to following Bernardino Bolanos along with you.    Sincerely,    Minda Murrell MD    Enclosure  CC:  No Recipients    If you would like to receive this communication electronically, please contact [email or web address] or [phone] to request more information on [employer] access.    For providers and/or their staff who would like to refer a patient to [facility], please contact us through our one-stop-shop provider referral line, [facility], at [phone].    If you feel you have received this communication in error or would no longer like to receive these types of communications, please e-mail [email or web address]

## 2021-01-02 ENCOUNTER — CLINICAL SUPPORT (OUTPATIENT)
Dept: URGENT CARE | Facility: CLINIC | Age: 31
End: 2021-01-02
Payer: COMMERCIAL

## 2021-01-02 VITALS — TEMPERATURE: 99 F | HEART RATE: 63 BPM | RESPIRATION RATE: 16 BRPM | OXYGEN SATURATION: 98 %

## 2021-01-02 DIAGNOSIS — Z11.59 ENCOUNTER FOR SCREENING FOR OTHER VIRAL DISEASES: Primary | ICD-10-CM

## 2021-01-02 LAB
CTP QC/QA: YES
SARS-COV-2 RDRP RESP QL NAA+PROBE: NEGATIVE

## 2021-01-02 PROCEDURE — 99211 PR OFFICE/OUTPT VISIT, EST, LEVL I: ICD-10-PCS | Mod: S$GLB,,, | Performed by: EMERGENCY MEDICINE

## 2021-01-02 PROCEDURE — 99211 OFF/OP EST MAY X REQ PHY/QHP: CPT | Mod: S$GLB,,, | Performed by: EMERGENCY MEDICINE

## 2021-01-02 PROCEDURE — U0002: ICD-10-PCS | Mod: QW,S$GLB,, | Performed by: EMERGENCY MEDICINE

## 2021-01-02 PROCEDURE — U0002 COVID-19 LAB TEST NON-CDC: HCPCS | Mod: QW,S$GLB,, | Performed by: EMERGENCY MEDICINE

## 2021-03-15 ENCOUNTER — OFFICE VISIT (OUTPATIENT)
Dept: FAMILY MEDICINE | Facility: CLINIC | Age: 31
End: 2021-03-15
Payer: COMMERCIAL

## 2021-03-15 DIAGNOSIS — E66.3 OVERWEIGHT (BMI 25.0-29.9): ICD-10-CM

## 2021-03-15 DIAGNOSIS — S33.5XXA SPRAIN OF LOW BACK, INITIAL ENCOUNTER: Primary | ICD-10-CM

## 2021-03-15 PROCEDURE — 99213 OFFICE O/P EST LOW 20 MIN: CPT | Mod: 95,,, | Performed by: NURSE PRACTITIONER

## 2021-03-15 PROCEDURE — 99213 PR OFFICE/OUTPT VISIT, EST, LEVL III, 20-29 MIN: ICD-10-PCS | Mod: 95,,, | Performed by: NURSE PRACTITIONER

## 2021-03-15 RX ORDER — NAPROXEN SODIUM 550 MG/1
550 TABLET ORAL
Qty: 42 TABLET | Refills: 0 | Status: SHIPPED | OUTPATIENT
Start: 2021-03-15 | End: 2021-03-29

## 2021-03-15 RX ORDER — METHOCARBAMOL 500 MG/1
500 TABLET, FILM COATED ORAL 3 TIMES DAILY
Qty: 42 TABLET | Refills: 0 | Status: SHIPPED | OUTPATIENT
Start: 2021-03-15 | End: 2021-03-29

## 2021-04-29 ENCOUNTER — PATIENT MESSAGE (OUTPATIENT)
Dept: RESEARCH | Facility: HOSPITAL | Age: 31
End: 2021-04-29

## 2021-12-23 ENCOUNTER — PATIENT MESSAGE (OUTPATIENT)
Dept: ADMINISTRATIVE | Facility: CLINIC | Age: 31
End: 2021-12-23
Payer: COMMERCIAL

## 2021-12-23 ENCOUNTER — OFFICE VISIT (OUTPATIENT)
Dept: URGENT CARE | Facility: CLINIC | Age: 31
End: 2021-12-23
Payer: COMMERCIAL

## 2021-12-23 VITALS
RESPIRATION RATE: 20 BRPM | OXYGEN SATURATION: 96 % | HEIGHT: 74 IN | TEMPERATURE: 100 F | BODY MASS INDEX: 27.34 KG/M2 | HEART RATE: 94 BPM | WEIGHT: 213 LBS | DIASTOLIC BLOOD PRESSURE: 75 MMHG | SYSTOLIC BLOOD PRESSURE: 132 MMHG

## 2021-12-23 DIAGNOSIS — Z20.822 COVID-19 VIRUS NOT DETECTED: Primary | ICD-10-CM

## 2021-12-23 DIAGNOSIS — R05.9 COUGH: ICD-10-CM

## 2021-12-23 LAB
CTP QC/QA: YES
SARS-COV-2 RDRP RESP QL NAA+PROBE: POSITIVE

## 2021-12-23 PROCEDURE — U0002: ICD-10-PCS | Mod: QW,S$GLB,, | Performed by: NURSE PRACTITIONER

## 2021-12-23 PROCEDURE — 99213 PR OFFICE/OUTPT VISIT, EST, LEVL III, 20-29 MIN: ICD-10-PCS | Mod: S$GLB,,, | Performed by: NURSE PRACTITIONER

## 2021-12-23 PROCEDURE — 3075F PR MOST RECENT SYSTOLIC BLOOD PRESS GE 130-139MM HG: ICD-10-PCS | Mod: CPTII,S$GLB,, | Performed by: NURSE PRACTITIONER

## 2021-12-23 PROCEDURE — 3078F DIAST BP <80 MM HG: CPT | Mod: CPTII,S$GLB,, | Performed by: NURSE PRACTITIONER

## 2021-12-23 PROCEDURE — 1160F PR REVIEW ALL MEDS BY PRESCRIBER/CLIN PHARMACIST DOCUMENTED: ICD-10-PCS | Mod: CPTII,S$GLB,, | Performed by: NURSE PRACTITIONER

## 2021-12-23 PROCEDURE — 3075F SYST BP GE 130 - 139MM HG: CPT | Mod: CPTII,S$GLB,, | Performed by: NURSE PRACTITIONER

## 2021-12-23 PROCEDURE — 3008F BODY MASS INDEX DOCD: CPT | Mod: CPTII,S$GLB,, | Performed by: NURSE PRACTITIONER

## 2021-12-23 PROCEDURE — 1159F MED LIST DOCD IN RCRD: CPT | Mod: CPTII,S$GLB,, | Performed by: NURSE PRACTITIONER

## 2021-12-23 PROCEDURE — 99213 OFFICE O/P EST LOW 20 MIN: CPT | Mod: S$GLB,,, | Performed by: NURSE PRACTITIONER

## 2021-12-23 PROCEDURE — U0002 COVID-19 LAB TEST NON-CDC: HCPCS | Mod: QW,S$GLB,, | Performed by: NURSE PRACTITIONER

## 2021-12-23 PROCEDURE — 1160F RVW MEDS BY RX/DR IN RCRD: CPT | Mod: CPTII,S$GLB,, | Performed by: NURSE PRACTITIONER

## 2021-12-23 PROCEDURE — 3008F PR BODY MASS INDEX (BMI) DOCUMENTED: ICD-10-PCS | Mod: CPTII,S$GLB,, | Performed by: NURSE PRACTITIONER

## 2021-12-23 PROCEDURE — 3078F PR MOST RECENT DIASTOLIC BLOOD PRESSURE < 80 MM HG: ICD-10-PCS | Mod: CPTII,S$GLB,, | Performed by: NURSE PRACTITIONER

## 2021-12-23 PROCEDURE — 1159F PR MEDICATION LIST DOCUMENTED IN MEDICAL RECORD: ICD-10-PCS | Mod: CPTII,S$GLB,, | Performed by: NURSE PRACTITIONER

## 2021-12-23 RX ORDER — BENZONATATE 200 MG/1
200 CAPSULE ORAL 3 TIMES DAILY PRN
Qty: 21 CAPSULE | Refills: 0 | Status: SHIPPED | OUTPATIENT
Start: 2021-12-23

## 2021-12-23 RX ORDER — ALBUTEROL SULFATE 90 UG/1
2 AEROSOL, METERED RESPIRATORY (INHALATION) EVERY 6 HOURS PRN
Qty: 8 G | Refills: 0 | Status: SHIPPED | OUTPATIENT
Start: 2021-12-23

## 2021-12-23 RX ORDER — FLUTICASONE PROPIONATE 50 MCG
1 SPRAY, SUSPENSION (ML) NASAL DAILY
Qty: 11.1 ML | Refills: 0 | Status: SHIPPED | OUTPATIENT
Start: 2021-12-23

## 2021-12-25 ENCOUNTER — TELEPHONE (OUTPATIENT)
Dept: ADMINISTRATIVE | Facility: CLINIC | Age: 31
End: 2021-12-25
Payer: COMMERCIAL

## 2021-12-26 ENCOUNTER — NURSE TRIAGE (OUTPATIENT)
Dept: ADMINISTRATIVE | Facility: CLINIC | Age: 31
End: 2021-12-26
Payer: COMMERCIAL

## 2022-01-07 ENCOUNTER — OFFICE VISIT (OUTPATIENT)
Dept: INTERNAL MEDICINE | Facility: CLINIC | Age: 32
End: 2022-01-07
Payer: COMMERCIAL

## 2022-01-07 ENCOUNTER — HOSPITAL ENCOUNTER (OUTPATIENT)
Dept: RADIOLOGY | Facility: HOSPITAL | Age: 32
Discharge: HOME | End: 2022-01-07
Attending: NURSE PRACTITIONER
Payer: COMMERCIAL

## 2022-01-07 VITALS
WEIGHT: 214.94 LBS | OXYGEN SATURATION: 98 % | HEART RATE: 86 BPM | BODY MASS INDEX: 27.59 KG/M2 | DIASTOLIC BLOOD PRESSURE: 80 MMHG | HEIGHT: 74 IN | SYSTOLIC BLOOD PRESSURE: 138 MMHG

## 2022-01-07 DIAGNOSIS — H93.8X1 EAR FULLNESS, RIGHT: ICD-10-CM

## 2022-01-07 DIAGNOSIS — U09.9 POST-COVID-19 CONDITION: Primary | ICD-10-CM

## 2022-01-07 DIAGNOSIS — H91.93 BILATERAL HEARING LOSS, UNSPECIFIED HEARING LOSS TYPE: ICD-10-CM

## 2022-01-07 DIAGNOSIS — U07.1 COVID-19 VIRUS INFECTION: ICD-10-CM

## 2022-01-07 DIAGNOSIS — H91.91 HEARING LOSS OF RIGHT EAR, UNSPECIFIED HEARING LOSS TYPE: ICD-10-CM

## 2022-01-07 PROCEDURE — 99999 PR PBB SHADOW E&M-EST. PATIENT-LVL III: CPT | Mod: PBBFAC,,, | Performed by: NURSE PRACTITIONER

## 2022-01-07 PROCEDURE — 71046 X-RAY EXAM CHEST 2 VIEWS: CPT | Mod: 26,,, | Performed by: RADIOLOGY

## 2022-01-07 PROCEDURE — 1159F PR MEDICATION LIST DOCUMENTED IN MEDICAL RECORD: ICD-10-PCS | Mod: CPTII,S$GLB,, | Performed by: NURSE PRACTITIONER

## 2022-01-07 PROCEDURE — 3079F DIAST BP 80-89 MM HG: CPT | Mod: CPTII,S$GLB,, | Performed by: NURSE PRACTITIONER

## 2022-01-07 PROCEDURE — 3008F PR BODY MASS INDEX (BMI) DOCUMENTED: ICD-10-PCS | Mod: CPTII,S$GLB,, | Performed by: NURSE PRACTITIONER

## 2022-01-07 PROCEDURE — 99214 OFFICE O/P EST MOD 30 MIN: CPT | Mod: S$GLB,,, | Performed by: NURSE PRACTITIONER

## 2022-01-07 PROCEDURE — 99999 PR PBB SHADOW E&M-EST. PATIENT-LVL III: ICD-10-PCS | Mod: PBBFAC,,, | Performed by: NURSE PRACTITIONER

## 2022-01-07 PROCEDURE — 3075F PR MOST RECENT SYSTOLIC BLOOD PRESS GE 130-139MM HG: ICD-10-PCS | Mod: CPTII,S$GLB,, | Performed by: NURSE PRACTITIONER

## 2022-01-07 PROCEDURE — 1159F MED LIST DOCD IN RCRD: CPT | Mod: CPTII,S$GLB,, | Performed by: NURSE PRACTITIONER

## 2022-01-07 PROCEDURE — 99214 PR OFFICE/OUTPT VISIT, EST, LEVL IV, 30-39 MIN: ICD-10-PCS | Mod: S$GLB,,, | Performed by: NURSE PRACTITIONER

## 2022-01-07 PROCEDURE — 71046 X-RAY EXAM CHEST 2 VIEWS: CPT | Mod: TC

## 2022-01-07 PROCEDURE — 3075F SYST BP GE 130 - 139MM HG: CPT | Mod: CPTII,S$GLB,, | Performed by: NURSE PRACTITIONER

## 2022-01-07 PROCEDURE — 71046 XR CHEST PA AND LATERAL: ICD-10-PCS | Mod: 26,,, | Performed by: RADIOLOGY

## 2022-01-07 PROCEDURE — 3008F BODY MASS INDEX DOCD: CPT | Mod: CPTII,S$GLB,, | Performed by: NURSE PRACTITIONER

## 2022-01-07 PROCEDURE — 3079F PR MOST RECENT DIASTOLIC BLOOD PRESSURE 80-89 MM HG: ICD-10-PCS | Mod: CPTII,S$GLB,, | Performed by: NURSE PRACTITIONER

## 2022-01-07 RX ORDER — PREDNISONE 20 MG/1
20 TABLET ORAL DAILY
Qty: 5 TABLET | Refills: 0 | Status: SHIPPED | OUTPATIENT
Start: 2022-01-07

## 2022-01-07 NOTE — PROGRESS NOTES
"INTERNAL MEDICINE CLINIC - SAME DAY APPOINTMENT  Progress Note    PRESENTING HISTORY     PCP: Primary Doctor No    Chief Complaint/Reason for Visit:   No chief complaint on file.      History of Present Illness & ROS : Mr. Bernardino Bolanos is a 31 y.o. male.    Same day appt.  Pleasant young man. Wife is a nurse.   New to this provider and clinic. Reports that was diagnosed with COVID 3 weeks ago and still with some "chest discomforts".   No fever, chills or cough. No chest pain.     Started using the Albuterol inhaler as of yesterday, not taking Tessalon.     He also wants to report that "seen by an ENT doctor some time ago for muffled hearing to right ear; no trauma or infection". Just "muffled". "Not gotten better and bothersome".     Review of Systems:  Eyes: denies visual changes at this time denies floaters   ENT: no nasal congestion or sore throat  Respiratory: no cough or shorness of breath  Cardiovascular: no chest pain or palpitations  Gastrointestinal: no nausea or vomiting, no abdominal pain or change in bowel habits  Genitourinary: no hematuria or dysuria; denies frequency  Hematologic/Lymphatic: no easy bruising or lymphadenopathy  Musculoskeletal: no arthralgias or myalgias  Neurological: no seizures or tremors  Endocrine: no heat or cold intolerance      PAST HISTORY:     Past Medical History:   Diagnosis Date    Helicobacter pylori infection        Past Surgical History:   Procedure Laterality Date    FRACTURE SURGERY Right 2008    RIGHT HAND       Family History   Problem Relation Age of Onset    Cancer Mother        Social History     Socioeconomic History    Marital status:    Tobacco Use    Smoking status: Never Smoker    Smokeless tobacco: Never Used   Substance and Sexual Activity    Alcohol use: Yes     Comment: rarely    Drug use: No    Sexual activity: Yes     Partners: Female       MEDICATIONS & ALLERGIES:     Current Outpatient Medications on File Prior to Visit "   Medication Sig Dispense Refill    albuterol (VENTOLIN HFA) 90 mcg/actuation inhaler Inhale 2 puffs into the lungs every 6 (six) hours as needed for Wheezing or Shortness of Breath. Rescue 8 g 0    benzonatate (TESSALON) 200 MG capsule Take 1 capsule (200 mg total) by mouth 3 (three) times daily as needed for Cough. 21 capsule 0    fluticasone propionate (FLONASE) 50 mcg/actuation nasal spray 1 spray (50 mcg total) by Each Nostril route once daily. 11.1 mL 0    pulse oximeter (PULSE OXIMETER) device by Apply Externally route 2 (two) times a day. Use twice daily at 8 AM and 3 PM and record the value in [employer] as directed. 1 each 0     No current facility-administered medications on file prior to visit.        Review of patient's allergies indicates:  No Known Allergies    Medications Reconciliation:   I have reconciled the patient's home medications with the patient/family. I have updated all changes.  Refer to After-Visit Medication List.    OBJECTIVE:     Vital Signs:  There were no vitals filed for this visit.  Wt Readings from Last 3 Encounters:   12/23/21 1934 96.6 kg (213 lb)   09/08/20 1550 89.8 kg (197 lb 15.6 oz)   09/02/20 1822 90.3 kg (199 lb 1.2 oz)     There is no height or weight on file to calculate BMI.   Wt Readings from Last 3 Encounters:   01/07/22 97.5 kg (214 lb 15.2 oz)   12/23/21 96.6 kg (213 lb)   09/08/20 89.8 kg (197 lb 15.6 oz)     Temp Readings from Last 3 Encounters:   12/23/21 100 °F (37.8 °C)   01/02/21 98.7 °F (37.1 °C)   09/02/20 98.1 °F (36.7 °C)     BP Readings from Last 3 Encounters:   01/07/22 138/80   12/23/21 132/75   09/08/20 133/71     Pulse Readings from Last 3 Encounters:   01/07/22 86   12/23/21 94   01/02/21 63       Physical Exam:  (Focused Exam)  General: Well developed, well nourished. No distress.  HEENT: Head is normocephalic, atraumatic; ears are normal.   Eyes: Clear conjunctiva.  Neck: Supple, symmetrical neck; trachea midline.  Lungs: Clear to auscultation  bilaterally and normal respiratory effort.  Cardiovascular: Heart with regular rate and rhythm. No murmurs, gallops or rubs  Extremities: No LE edema. Pulses 2+ and symmetric.   Skin: Skin color, texture, turgor normal. No rashes.  Musculoskeletal: Normal gait.   Lymph Nodes: No cervical or supraclavicular adenopathy.  Neurologic: Normal strength and tone. No focal numbness or weakness.   Psychiatric: Not depressed.        Laboratory  Lab Results   Component Value Date    WBC 7.65 2019    HGB 15.0 2019    HCT 44.5 2019     2019    ALT 10 2019    AST 15 2019     2019    K 3.6 2019     2019    CREATININE 1.1 2019    BUN 9 2019    CO2 30 (H) 2019         ASSESSMENT & PLAN:     Same day appt.     Post-COVID-19 condition  15 days post diagnosis: 2021  Satin%RA  -     X-Ray Chest PA And Lateral; Future; Expected date: 2022   Other orders  -     predniSONE (DELTASONE) 20 MG tablet; Take 1 tablet (20 mg total) by mouth once daily.  Dispense: 5 tablet; Refill: 0    ` Advised to continue the Albuterol MDI PRN    Right Ear Impaired hearing, unspecified hearing loss type  Ear fullness, right  -     Ambulatory referral/consult to ENT; Future; Expected date: 2022  -     Comprehensive audiogram; Future      Future Appointments   Date Time Provider Department Center   2022  7:30 AM Kade Argueta III, MD [facility] E[facility]        Medication List          Accurate as of 2022  4:17 PM. If you have any questions, ask your nurse or doctor.            START taking these medications    predniSONE 20 MG tablet  Commonly known as: DELTASONE  Take 1 tablet (20 mg total) by mouth once daily.  Started by: HAYLIE Baugh        CONTINUE taking these medications    albuterol 90 mcg/actuation inhaler  Commonly known as: VENTOLIN HFA  Inhale 2 puffs into the lungs every 6 (six) hours as needed for  Wheezing or Shortness of Breath. Rescue     benzonatate 200 MG capsule  Commonly known as: TESSALON  Take 1 capsule (200 mg total) by mouth 3 (three) times daily as needed for Cough.     fluticasone propionate 50 mcg/actuation nasal spray  Commonly known as: FLONASE  1 spray (50 mcg total) by Each Nostril route once daily.     pulse oximeter device  Commonly known as: pulse oximeter  by Apply Externally route 2 (two) times a day. Use twice daily at 8 AM and 3 PM and record the value in [employer] as directed.           Where to Get Your Medications      These medications were sent to [facility]/pharmacy #[id number] - [place] - [address]  [address] [place] [address]    Hours: 24-hours Phone: [phone]   · predniSONE 20 MG tablet         Signing Physician:  HAYLIE Baugh

## 2022-05-09 ENCOUNTER — OFFICE VISIT (OUTPATIENT)
Dept: OTOLARYNGOLOGY | Facility: CLINIC | Age: 32
End: 2022-05-09
Payer: COMMERCIAL

## 2022-05-09 ENCOUNTER — CLINICAL SUPPORT (OUTPATIENT)
Dept: AUDIOLOGY | Facility: CLINIC | Age: 32
End: 2022-05-09
Payer: COMMERCIAL

## 2022-05-09 VITALS — HEART RATE: 68 BPM | DIASTOLIC BLOOD PRESSURE: 80 MMHG | SYSTOLIC BLOOD PRESSURE: 146 MMHG

## 2022-05-09 DIAGNOSIS — H93.8X1 BLOCKED EAR, RIGHT: ICD-10-CM

## 2022-05-09 DIAGNOSIS — H92.01 EAR DISCOMFORT, RIGHT: ICD-10-CM

## 2022-05-09 DIAGNOSIS — H69.91 NEGATIVE MIDDLE EAR PRESSURE OF RIGHT EAR: ICD-10-CM

## 2022-05-09 DIAGNOSIS — H93.8X1 EAR POPPING, RIGHT: Primary | ICD-10-CM

## 2022-05-09 DIAGNOSIS — H93.291 ABNORMAL AUDITORY PERCEPTION OF RIGHT EAR: Primary | ICD-10-CM

## 2022-05-09 PROCEDURE — 3079F PR MOST RECENT DIASTOLIC BLOOD PRESSURE 80-89 MM HG: ICD-10-PCS | Mod: CPTII,S$GLB,, | Performed by: OTOLARYNGOLOGY

## 2022-05-09 PROCEDURE — 99999 PR PBB SHADOW E&M-EST. PATIENT-LVL I: ICD-10-PCS | Mod: PBBFAC,,,

## 2022-05-09 PROCEDURE — 3077F SYST BP >= 140 MM HG: CPT | Mod: CPTII,S$GLB,, | Performed by: OTOLARYNGOLOGY

## 2022-05-09 PROCEDURE — 1159F MED LIST DOCD IN RCRD: CPT | Mod: CPTII,S$GLB,, | Performed by: OTOLARYNGOLOGY

## 2022-05-09 PROCEDURE — 99213 OFFICE O/P EST LOW 20 MIN: CPT | Mod: S$GLB,,, | Performed by: OTOLARYNGOLOGY

## 2022-05-09 PROCEDURE — 1159F PR MEDICATION LIST DOCUMENTED IN MEDICAL RECORD: ICD-10-PCS | Mod: CPTII,S$GLB,, | Performed by: OTOLARYNGOLOGY

## 2022-05-09 PROCEDURE — 92557 COMPREHENSIVE HEARING TEST: CPT | Mod: S$GLB,,,

## 2022-05-09 PROCEDURE — 92567 TYMPANOMETRY: CPT | Mod: S$GLB,,,

## 2022-05-09 PROCEDURE — 92557 PR COMPREHENSIVE HEARING TEST: ICD-10-PCS | Mod: S$GLB,,,

## 2022-05-09 PROCEDURE — 3077F PR MOST RECENT SYSTOLIC BLOOD PRESSURE >= 140 MM HG: ICD-10-PCS | Mod: CPTII,S$GLB,, | Performed by: OTOLARYNGOLOGY

## 2022-05-09 PROCEDURE — 99999 PR PBB SHADOW E&M-EST. PATIENT-LVL I: CPT | Mod: PBBFAC,,,

## 2022-05-09 PROCEDURE — 99999 PR PBB SHADOW E&M-EST. PATIENT-LVL III: CPT | Mod: PBBFAC,,, | Performed by: OTOLARYNGOLOGY

## 2022-05-09 PROCEDURE — 99999 PR PBB SHADOW E&M-EST. PATIENT-LVL III: ICD-10-PCS | Mod: PBBFAC,,, | Performed by: OTOLARYNGOLOGY

## 2022-05-09 PROCEDURE — 99213 PR OFFICE/OUTPT VISIT, EST, LEVL III, 20-29 MIN: ICD-10-PCS | Mod: S$GLB,,, | Performed by: OTOLARYNGOLOGY

## 2022-05-09 PROCEDURE — 3079F DIAST BP 80-89 MM HG: CPT | Mod: CPTII,S$GLB,, | Performed by: OTOLARYNGOLOGY

## 2022-05-09 PROCEDURE — 92567 PR TYMPA2METRY: ICD-10-PCS | Mod: S$GLB,,,

## 2022-05-09 NOTE — PROGRESS NOTES
"Bernardino Bolanos was seen today in the clinic for an audiologic evaluation.  Patient's main complaint was otalgia and aural fullness in the right ear. Mr. Bolanos reported his symptoms have been present for many years and that they are not as bad today as they have been in the past. He reported "muffled" hearing in his right ear only. Mr. Bolanos denied tinnitus, true vertigo and history of noise exposure.    Tympanometry revealed Type A tympanograms, bilaterally.    Audiogram results revealed normal hearing sensitivity, bilaterally.    Speech reception thresholds were noted at 5 dB in the right ear and 5 dB in the left ear.    Speech discrimination scores were 100% in the right ear and 100% in the left ear.    Recommendations:  1. Otologic evaluation  2. Repeat audiogram as needed or sooner if change perceived  3. Hearing protection in noise        "

## 2022-05-09 NOTE — PROGRESS NOTES
"CC:  HPI:Mr. Bolanos is a 32 year old AAM who c/o a blocked senation in his right ear which he cannot "pop" open well.   The blocked sensation has improved over the past two weeks since he took a plane trip; he had trouble clearing his ears on descent..   He denies any significant hx of seasonal allergies.   He denies any T&A surgery or PE intubation procedures.  His ears used to cleaned due to wax build- up in his youth.  He admits to getting feedback from his wife that he does not hear well.    He was diagnosed with acute COVID in late December ( 12/23) 2021. He had complained of a muffled sensation in his right ear during the visit with the nurse practitioner 01/07/2022.  He was treated with # 5   20 mg Deltasone tablets taken one a day.  He was advised to follow up with the ENT service.    He was examined by ENT MD Dr. Murrell 09/08/2020 in [place] for decreased hearing ability affecting the right ear, right-sided ear fullness and muffled hearing.  He stated that he could not "pop" his right ear at that time as well.  No abnormalities were visualized on physical exam. Audiometry was WNL per his hx.  No specific treatment was offered at that time.    I had evaluated him for nasal congestion symptoms as well as discharge mucus/tissue from his nose in late November 2016.  He completed an audiometric study here in 2014 for left-sided ear symptoms.  Tympanometry at that time indicated negative pressure for the right ear interestingly.  There was no documented hearing loss in either ear then.      He is a .      Past Medical History:   Diagnosis Date    Helicobacter pylori infection      Past Surgical History:   Procedure Laterality Date    FRACTURE SURGERY Right 2008    RIGHT HAND     Current Outpatient Medications on File Prior to Visit   Medication Sig Dispense Refill    albuterol (VENTOLIN HFA) 90 mcg/actuation inhaler Inhale 2 puffs into the lungs every 6 (six) hours as needed for Wheezing or " Shortness of Breath. Rescue 8 g 0    benzonatate (TESSALON) 200 MG capsule Take 1 capsule (200 mg total) by mouth 3 (three) times daily as needed for Cough. 21 capsule 0    fluticasone propionate (FLONASE) 50 mcg/actuation nasal spray 1 spray (50 mcg total) by Each Nostril route once daily. 11.1 mL 0    predniSONE (DELTASONE) 20 MG tablet Take 1 tablet (20 mg total) by mouth once daily. 5 tablet 0    pulse oximeter (PULSE OXIMETER) device by Apply Externally route 2 (two) times a day. Use twice daily at 8 AM and 3 PM and record the value in [employer] as directed. 1 each 0     No current facility-administered medications on file prior to visit.           PE:Gen: alert and oriented AAM in no acute distress    Both ears are examined under the microscope.  The right TM is sl. opaque and intact; there is no obvious evidence of a right serous otitis media condition.  The right ear canal is devoid of wax.  The left TM is intact in left middle ear space appears well aerated.  There is no evidence of cerumen in the left ear canal.  Nasal exam reveals evidence for more patent right nasal passage compared to the left.  There is evidence for left inferior turbinate hypertrophy.  Oropharyngeal exam reveals evidence of a slightly elongated uvula with 2+ tonsils which are not exudative.  There is no evidence of posterior pharyngitis.  Nasopharyngoscopy was not performed today    The patient completed an audiometric study today performed by the [facility] audiology service.  The study is duplicated below and the results are reviewed with him.      DIAGNOSIS:     ICD-10-CM ICD-9-CM    1. Ear popping, right  H93.8X1 388.8    2. Ear discomfort, right  H92.01 388.70    3. Blocked ear, right  H93.8X1 388.8     improving now   4. Negative middle ear pressure of right ear  H69.91 385.89        PLAN: Audiometry reviewed; hearing WNL  Gentle middle ear insufflation techniques encouraged prn  May use Flonase for rhinitis sx prn; avoid  sniffing  May consider AD PE intubation procedure ( risks/sequelae discussed) vs.     Consultation with Dr. ALIA Khoury re: right ear eustachian tube balloon catheterization procedure / nasopharyngoscopy pending course; literature provided  Call for any significant change in otologic status

## 2022-05-09 NOTE — PATIENT INSTRUCTIONS
Audiometry reviewed; hearing WNL  Gentle middle ear insufflation techniques encouraged prn  May use Flonase for rhinitis sx prn; avoid sniffing  May consider AD PE intubation procedure ( risks/sequelae discussed) vs.     Consultation with Dr. ALIA Khoury re: right ear eustachian tube balloon catheterization procedure / nasopharyngoscopy pending course; literature provided  Call for any significant change in otologic status

## 2024-08-08 ENCOUNTER — TELEPHONE (OUTPATIENT)
Dept: ORTHOPEDICS | Facility: CLINIC | Age: 34
End: 2024-08-08
Payer: COMMERCIAL

## 2024-08-12 ENCOUNTER — TELEPHONE (OUTPATIENT)
Dept: ORTHOPEDICS | Facility: CLINIC | Age: 34
End: 2024-08-12
Payer: COMMERCIAL

## 2024-08-12 NOTE — TELEPHONE ENCOUNTER
----- Message from Regi Deluna sent at 8/12/2024  7:45 AM CDT -----  Regarding: Reschedule Appt  Contact: Pt  Pt would like to reschedule Appt    Please call    Thank You     Phone- [phone]

## 2024-08-19 ENCOUNTER — HOSPITAL ENCOUNTER (OUTPATIENT)
Dept: RADIOLOGY | Facility: HOSPITAL | Age: 34
Discharge: HOME | End: 2024-08-19
Payer: COMMERCIAL

## 2024-08-19 ENCOUNTER — OFFICE VISIT (OUTPATIENT)
Dept: ORTHOPEDICS | Facility: CLINIC | Age: 34
End: 2024-08-19
Payer: COMMERCIAL

## 2024-08-19 DIAGNOSIS — M25.562 LEFT KNEE PAIN, UNSPECIFIED CHRONICITY: ICD-10-CM

## 2024-08-19 DIAGNOSIS — G89.29 CHRONIC PAIN OF RIGHT KNEE: Primary | ICD-10-CM

## 2024-08-19 DIAGNOSIS — M25.561 CHRONIC PAIN OF RIGHT KNEE: Primary | ICD-10-CM

## 2024-08-19 DIAGNOSIS — M25.562 LEFT KNEE PAIN, UNSPECIFIED CHRONICITY: Primary | ICD-10-CM

## 2024-08-19 PROCEDURE — 73562 X-RAY EXAM OF KNEE 3: CPT | Mod: TC,RT

## 2024-08-19 PROCEDURE — 1160F RVW MEDS BY RX/DR IN RCRD: CPT | Mod: CPTII,S$GLB,,

## 2024-08-19 PROCEDURE — 73564 X-RAY EXAM KNEE 4 OR MORE: CPT | Mod: 26,LT,, | Performed by: RADIOLOGY

## 2024-08-19 PROCEDURE — 73562 X-RAY EXAM OF KNEE 3: CPT | Mod: 26,59,RT, | Performed by: RADIOLOGY

## 2024-08-19 PROCEDURE — 99999 PR PBB SHADOW E&M-EST. PATIENT-LVL III: CPT | Mod: PBBFAC,,,

## 2024-08-19 PROCEDURE — 99203 OFFICE O/P NEW LOW 30 MIN: CPT | Mod: S$GLB,,,

## 2024-08-19 PROCEDURE — 73564 X-RAY EXAM KNEE 4 OR MORE: CPT | Mod: TC,LT

## 2024-08-19 PROCEDURE — 1159F MED LIST DOCD IN RCRD: CPT | Mod: CPTII,S$GLB,,

## 2024-08-19 RX ORDER — NEOMYCIN SULFATE, POLYMYXIN B SULFATE, AND DEXAMETHASONE 3.5; 10000; 1 MG/G; [USP'U]/G; MG/G
OINTMENT OPHTHALMIC
COMMUNITY
Start: 2024-03-12
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
